# Patient Record
Sex: MALE | Race: BLACK OR AFRICAN AMERICAN | Employment: FULL TIME | URBAN - METROPOLITAN AREA
[De-identification: names, ages, dates, MRNs, and addresses within clinical notes are randomized per-mention and may not be internally consistent; named-entity substitution may affect disease eponyms.]

---

## 2018-09-19 ENCOUNTER — HOSPITAL ENCOUNTER (EMERGENCY)
Age: 23
Discharge: HOME | End: 2018-09-19
Attending: EMERGENCY MEDICINE | Admitting: EMERGENCY MEDICINE
Payer: SELF-PAY

## 2018-09-19 VITALS
WEIGHT: 135 LBS | OXYGEN SATURATION: 100 % | BODY MASS INDEX: 20.46 KG/M2 | HEIGHT: 68 IN | RESPIRATION RATE: 18 BRPM | HEART RATE: 91 BPM | SYSTOLIC BLOOD PRESSURE: 132 MMHG | TEMPERATURE: 98.9 F | DIASTOLIC BLOOD PRESSURE: 66 MMHG

## 2018-09-19 DIAGNOSIS — J02.9 ACUTE PHARYNGITIS, UNSPECIFIED ETIOLOGY: Primary | ICD-10-CM

## 2018-09-19 DIAGNOSIS — B34.9 VIRAL SYNDROME: ICD-10-CM

## 2018-09-19 PROCEDURE — 99281 EMR DPT VST MAYX REQ PHY/QHP: CPT

## 2018-09-19 PROCEDURE — 87081 CULTURE SCREEN ONLY: CPT | Performed by: EMERGENCY MEDICINE

## 2018-09-19 RX ORDER — CODEINE PHOSPHATE AND GUAIFENESIN 10; 100 MG/5ML; MG/5ML
5 SOLUTION ORAL
Qty: 160 ML | Refills: 0 | Status: SHIPPED | OUTPATIENT
Start: 2018-09-19

## 2018-09-19 RX ORDER — ONDANSETRON 4 MG/1
4 TABLET, FILM COATED ORAL
Qty: 12 TAB | Refills: 0 | Status: SHIPPED | OUTPATIENT
Start: 2018-09-19

## 2018-09-19 NOTE — LETTER
[facility] [place] 
THE [facility] EMERGENCY DEPT 
[address] [id number] [phone] Work Note Date: 9/19/2018 To Whom It May concern: 
 
Elisa Alcala was seen and treated today in the emergency room by the following provider(s): 
Physician Assistant: Jack Quinonez PA-C. Please excuse missed work. Elisa Alcala may return to work on 9/22/2018. Sincerely, Theron Jasso PA-C

## 2018-09-20 NOTE — ED PROVIDER NOTES
EMERGENCY DEPARTMENT HISTORY AND PHYSICAL EXAM 
 
Date: 9/19/2018 Patient Name: Samara Capellan History of Presenting Illness Chief Complaint Patient presents with  Sore Throat  Diarrhea History Provided By: Patient Chief Complaint: sore throat Duration: 1 Days Timing:  Acute Location: throat Quality: sore Modifying Factors: none Associated Symptoms: subjective fever, headache, rhinorrhea, sneezing, nausea, vomiting, and diarrhea Additional History (Context):  
8:21 PM  
Feng Alas is a 26 y.o. male without significant PMHx who presents to the emergency department C/O sore throat starting last night. Associated sxs include subjective fever, headache, rhinorrhea, sneezing, nausea, vomiting, and diarrhea. He has not taken any medications for his sxs. Denies recent ill contacts. Pt denies any other sxs or complaints. PCP: Em Salas MD 
 
 
 
Past History Past Medical History: 
History reviewed. No pertinent past medical history. Past Surgical History: 
History reviewed. No pertinent surgical history. Family History: 
History reviewed. No pertinent family history. Social History: 
Social History Substance Use Topics  Smoking status: Current Every Day Smoker  Smokeless tobacco: Never Used  Alcohol use Yes Allergies: Allergies Allergen Reactions  Iodine Swelling Review of Systems Review of Systems Constitutional: Positive for fever (subjective). Negative for chills. HENT: Positive for congestion, rhinorrhea, sneezing and sore throat. Respiratory: Negative for cough and shortness of breath. Cardiovascular: Negative for chest pain. Gastrointestinal: Positive for diarrhea, nausea and vomiting. Negative for abdominal pain. Musculoskeletal: Positive for myalgias. Skin: Negative for rash. Neurological: Positive for headaches. Negative for dizziness, weakness and light-headedness. Hematological: Negative for adenopathy. All other systems reviewed and are negative. Physical Exam  
 
Vitals:  
 09/19/18 2015 BP: 132/66 Pulse: 91  
Resp: 18 Temp: 98.9 °F (37.2 °C) SpO2: 100% Weight: 61.2 kg (135 lb) Height: 5' 8\" (1.727 m) Physical Exam  
Constitutional: He is oriented to person, place, and time. He appears well-developed and well-nourished. No distress. AA male in NAD. Alert. Appears comfortable. No resp distress. In fast track room. HENT:  
Head: Normocephalic and atraumatic. Right Ear: External ear normal. No drainage or swelling. Tympanic membrane is not erythematous and not bulging. Left Ear: External ear normal. No drainage or swelling. Tympanic membrane is not erythematous and not bulging. Nose: Mucosal edema present. Mouth/Throat: Uvula is midline. No trismus in the jaw. No uvula swelling. Posterior oropharyngeal erythema present. No oropharyngeal exudate, posterior oropharyngeal edema or tonsillar abscesses. Eyes: Conjunctivae are normal. Right eye exhibits no discharge. Left eye exhibits no discharge. No scleral icterus. Neck: Normal range of motion. Cardiovascular: Normal rate, regular rhythm, normal heart sounds and intact distal pulses. Exam reveals no gallop and no friction rub. No murmur heard. Pulmonary/Chest: Effort normal and breath sounds normal. No accessory muscle usage. No tachypnea. No respiratory distress. He has no decreased breath sounds. He has no wheezes. He has no rhonchi. He has no rales. Abdominal: Soft. Bowel sounds are normal. He exhibits no distension. There is no tenderness. Lymphadenopathy:  
  He has no cervical adenopathy. Neurological: He is alert and oriented to person, place, and time. Skin: Skin is warm and dry. He is not diaphoretic. Psychiatric: He has a normal mood and affect. Judgment normal.  
Nursing note and vitals reviewed. Diagnostic Study Results Labs - 
 No results found for this or any previous visit (from the past 12 hour(s)). Radiologic Studies - No orders to display Medications given in the ED- Medications - No data to display Medical Decision Making I am the first provider for this patient. I reviewed the vital signs, available nursing notes, past medical history, past surgical history, family history and social history. Vital Signs-Reviewed the patient's vital signs. Pulse Oximetry Analysis - 100% on room air Records Reviewed: Nursing Notes Provider Notes (Medical Decision Making): URI, strep, sinusitis, mono, influenza, PNA, bronchitis, asthma, allergic Procedures: 
Procedures ED Course:  
8:21 PM Initial assessment performed. The patients presenting problems have been discussed, and they are in agreement with the care plan formulated and outlined with them. I have encouraged them to ask questions as they arise throughout their visit. Diagnosis and Disposition Afebrile. Lungs CTAB. No resp distress. Rapid strep neg. No evidence of PTA, RPA, or tiffanie's. Suspect viral. No evidence of SBI. Not clinically dehydrated. Will tx sxs. Rest. PO hydration. BRAT diet. Reasons to RTED discussed with pt. All questions answered. Pt feels comfortable going home at this time. Pt expressed understanding and he agrees with plan. DISCHARGE NOTE: 
8:48 PM  
Feng Agrawal's  results have been reviewed with him. He has been counseled regarding his diagnosis, treatment, and plan. He verbally conveys understanding and agreement of the signs, symptoms, diagnosis, treatment and prognosis and additionally agrees to follow up as discussed. He also agrees with the care-plan and conveys that all of his questions have been answered.   I have also provided discharge instructions for him that include: educational information regarding their diagnosis and treatment, and list of reasons why they would want to return to the ED prior to their follow-up appointment, should his condition change. He has been provided with education for proper emergency department utilization. CLINICAL IMPRESSION: 
 
1. Acute pharyngitis, unspecified etiology 2. Viral syndrome PLAN: 
1. D/C Home 2. Discharge Medication List as of 9/19/2018  8:49 PM  
  
START taking these medications Details  
ondansetron hcl (ZOFRAN) 4 mg tablet Take 1 Tab by mouth every eight (8) hours as needed for Nausea. , Print, Disp-12 Tab, R-0  
  
guaiFENesin-codeine (ROBITUSSIN AC) 100-10 mg/5 mL solution Take 5 mL by mouth three (3) times daily as needed for Cough. Max Daily Amount: 15 mL. , Print, Disp-160 mL, R-0  
  
loratadine-pseudoephedrine (CLARITIN-D 12 HOUR) 5-120 mg per tablet Take 1 Tab by mouth two (2) times a day. For nasal congestion, Print, Disp-20 Tab, R-0  
  
  
 
3. Follow-up Information Follow up With Details Comments Contact Info [address] CLINIC Schedule an appointment as soon as possible for a visit in 2 days For primary care follow up at the [facility] [address] [place], [address] [place] [address] [phone] THE [facility] EMERGENCY DEPT  As needed, If symptoms worsen [address] [place] [id number] [phone]  
  
 
_______________________________ Attestations: This note is prepared by Kristin Berry, acting as Scribe for Ann-Marie Mcduffie PA-C. Ann-Marie Mcduffie PA-C:  The scribe's documentation has been prepared under my direction and personally reviewed by me in its entirety.   I confirm that the note above accurately reflects all work, treatment, procedures, and medical decision making performed by me. 
_______________________________ 
  
 Tova Graham

## 2018-09-20 NOTE — DISCHARGE INSTRUCTIONS
Viral Infections: Care Instructions  Your Care Instructions    You don't feel well, but it's not clear what's causing it. You may have a viral infection. Viruses cause many illnesses, such as the common cold, influenza, fever, rashes, and the diarrhea, nausea, and vomiting that are often called \"stomach flu. \" You may wonder if antibiotic medicines could make you feel better. But antibiotics only treat infections caused by bacteria. They don't work on viruses. The good news is that viral infections usually aren't serious. Most will go away in a few days without medical treatment. In the meantime, there are a few things you can do to make yourself more comfortable. Follow-up care is a key part of your treatment and safety. Be sure to make and go to all appointments, and call your doctor if you are having problems. It's also a good idea to know your test results and keep a list of the medicines you take. How can you care for yourself at home? · Get plenty of rest if you feel tired. · Take an over-the-counter pain medicine if needed, such as acetaminophen (Tylenol), ibuprofen (Advil, Motrin), or naproxen (Aleve). Read and follow all instructions on the label. · Be careful when taking over-the-counter cold or flu medicines and Tylenol at the same time. Many of these medicines have acetaminophen, which is Tylenol. Read the labels to make sure that you are not taking more than the recommended dose. Too much acetaminophen (Tylenol) can be harmful. · Drink plenty of fluids, enough so that your urine is light yellow or clear like water. If you have kidney, heart, or liver disease and have to limit fluids, talk with your doctor before you increase the amount of fluids you drink. · Stay home from work, school, and other public places while you have a fever. When should you call for help? Call 911 anytime you think you may need emergency care.  For example, call if:    · You have severe trouble breathing.     · You passed out (lost consciousness).    Call your doctor now or seek immediate medical care if:    · You seem to be getting much sicker.     · You have a new or higher fever.     · You have blood in your stools.     · You have new belly pain, or your pain gets worse.     · You have a new rash.    Watch closely for changes in your health, and be sure to contact your doctor if:    · You start to get better and then get worse.     · You do not get better as expected. Where can you learn more? Go to http://augustine-courtney.info/. Enter T768 in the search box to learn more about \"Viral Infections: Care Instructions. \"  Current as of: November 18, 2017  Content Version: 11.7  © 8045-0915 [facility]. Care instructions adapted under license by [employer] (which disclaims liability or warranty for this information). If you have questions about a medical condition or this instruction, always ask your healthcare professional. [place] [address] any warranty or liability for your use of this information. Sore Throat: Care Instructions  Your Care Instructions    Infection by bacteria or a virus causes most sore throats. Cigarette smoke, dry air, air pollution, allergies, and yelling can also cause a sore throat. Sore throats can be painful and annoying. Fortunately, most sore throats go away on their own. If you have a bacterial infection, your doctor may prescribe antibiotics. Follow-up care is a key part of your treatment and safety. Be sure to make and go to all appointments, and call your doctor if you are having problems. It's also a good idea to know your test results and keep a list of the medicines you take. How can you care for yourself at home? · If your doctor prescribed antibiotics, take them as directed. Do not stop taking them just because you feel better. You need to take the full course of antibiotics.   · Gargle with warm salt water once an hour to help reduce swelling and relieve discomfort. Use 1 teaspoon of salt mixed in 1 cup of warm water. · Take an over-the-counter pain medicine, such as acetaminophen (Tylenol), ibuprofen (Advil, Motrin), or naproxen (Aleve). Read and follow all instructions on the label. · Be careful when taking over-the-counter cold or flu medicines and Tylenol at the same time. Many of these medicines have acetaminophen, which is Tylenol. Read the labels to make sure that you are not taking more than the recommended dose. Too much acetaminophen (Tylenol) can be harmful. · Drink plenty of fluids. Fluids may help soothe an irritated throat. Hot fluids, such as tea or soup, may help decrease throat pain. · Use over-the-counter throat lozenges to soothe pain. Regular cough drops or hard candy may also help. These should not be given to young children because of the risk of choking. · Do not smoke or allow others to smoke around you. If you need help quitting, talk to your doctor about stop-smoking programs and medicines. These can increase your chances of quitting for good. · Use a vaporizer or humidifier to add moisture to your bedroom. Follow the directions for cleaning the machine. When should you call for help? Call your doctor now or seek immediate medical care if:    · You have new or worse trouble swallowing.     · Your sore throat gets much worse on one side.    Watch closely for changes in your health, and be sure to contact your doctor if you do not get better as expected. Where can you learn more? Go to http://augustine-courtney.info/. Enter 062 441 80 19 in the search box to learn more about \"Sore Throat: Care Instructions. \"  Current as of: May 12, 2017  Content Version: 11.7  © 9476-1444 [employer]. Care instructions adapted under license by [facility] (which disclaims liability or warranty for this information).  If you have questions about a medical condition or this instruction, always ask your healthcare professional. [place] [address] any warranty or liability for your use of this information.

## 2018-09-22 LAB
B-HEM STREP THROAT QL CULT: NEGATIVE
B-HEM STREP THROAT QL CULT: NORMAL
BACTERIA SPEC CULT: NORMAL
SERVICE CMNT-IMP: NORMAL